# Patient Record
Sex: FEMALE | Race: WHITE | NOT HISPANIC OR LATINO
[De-identification: names, ages, dates, MRNs, and addresses within clinical notes are randomized per-mention and may not be internally consistent; named-entity substitution may affect disease eponyms.]

---

## 2018-02-20 ENCOUNTER — TRANSCRIPTION ENCOUNTER (OUTPATIENT)
Age: 43
End: 2018-02-20

## 2018-07-11 PROBLEM — Z00.00 ENCOUNTER FOR PREVENTIVE HEALTH EXAMINATION: Status: ACTIVE | Noted: 2018-07-11

## 2019-04-27 ENCOUNTER — TRANSCRIPTION ENCOUNTER (OUTPATIENT)
Age: 44
End: 2019-04-27

## 2019-04-30 ENCOUNTER — TRANSCRIPTION ENCOUNTER (OUTPATIENT)
Age: 44
End: 2019-04-30

## 2019-05-01 ENCOUNTER — APPOINTMENT (OUTPATIENT)
Dept: SURGERY | Facility: CLINIC | Age: 44
End: 2019-05-01

## 2021-09-04 ENCOUNTER — APPOINTMENT (OUTPATIENT)
Dept: MAMMOGRAPHY | Facility: CLINIC | Age: 46
End: 2021-09-04

## 2021-09-22 ENCOUNTER — OUTPATIENT (OUTPATIENT)
Dept: OUTPATIENT SERVICES | Facility: HOSPITAL | Age: 46
LOS: 1 days | End: 2021-09-22
Payer: COMMERCIAL

## 2021-09-22 ENCOUNTER — APPOINTMENT (OUTPATIENT)
Dept: ULTRASOUND IMAGING | Facility: CLINIC | Age: 46
End: 2021-09-22
Payer: COMMERCIAL

## 2021-09-22 ENCOUNTER — APPOINTMENT (OUTPATIENT)
Dept: MAMMOGRAPHY | Facility: CLINIC | Age: 46
End: 2021-09-22
Payer: COMMERCIAL

## 2021-09-22 DIAGNOSIS — Z00.8 ENCOUNTER FOR OTHER GENERAL EXAMINATION: ICD-10-CM

## 2021-09-22 PROCEDURE — 76641 ULTRASOUND BREAST COMPLETE: CPT | Mod: 26,50

## 2021-09-22 PROCEDURE — 77067 SCR MAMMO BI INCL CAD: CPT | Mod: 26

## 2021-09-22 PROCEDURE — 77063 BREAST TOMOSYNTHESIS BI: CPT | Mod: 26

## 2021-09-22 PROCEDURE — 76641 ULTRASOUND BREAST COMPLETE: CPT

## 2021-09-22 PROCEDURE — 77067 SCR MAMMO BI INCL CAD: CPT

## 2021-09-22 PROCEDURE — 77063 BREAST TOMOSYNTHESIS BI: CPT

## 2022-08-05 ENCOUNTER — APPOINTMENT (OUTPATIENT)
Dept: PLASTIC SURGERY | Facility: CLINIC | Age: 47
End: 2022-08-05

## 2022-08-05 VITALS
HEIGHT: 61.5 IN | SYSTOLIC BLOOD PRESSURE: 104 MMHG | BODY MASS INDEX: 19.2 KG/M2 | OXYGEN SATURATION: 98 % | TEMPERATURE: 97.9 F | WEIGHT: 103 LBS | DIASTOLIC BLOOD PRESSURE: 72 MMHG | HEART RATE: 71 BPM

## 2022-08-05 PROCEDURE — 99203 OFFICE O/P NEW LOW 30 MIN: CPT

## 2022-08-12 NOTE — PHYSICAL EXAM
[NI] : Normal [de-identified] : NAD, AxOx3 [de-identified] : non-labored breathing [de-identified] : Central middle forehead scar 1.2cm x 0.6 cm, depressed, hypopigmented

## 2022-08-12 NOTE — HISTORY OF PRESENT ILLNESS
[FreeTextEntry1] : JOE BEARD is a 47 year F who presents to discuss revision of a facial scar. She was referred by her neighbor who is a former patient. She states that in January 2022 she underwent Moh's surgery of the central forehead for BCC, this was performed by Dr. Adams. She states that since that time she has disliked the appearance of the scar which has become widened, depressed, and hypopigmented. She was scheduled for revision with her original Moh's surgeon, however, has decided she would like this to be done by a plastic surgeon instead. \par \par She is otherwise healthy. Takes vitamins. H/o D&C.

## 2022-10-21 ENCOUNTER — APPOINTMENT (OUTPATIENT)
Dept: PLASTIC SURGERY | Facility: CLINIC | Age: 47
End: 2022-10-21

## 2022-12-06 ENCOUNTER — NON-APPOINTMENT (OUTPATIENT)
Age: 47
End: 2022-12-06

## 2023-03-31 ENCOUNTER — APPOINTMENT (OUTPATIENT)
Dept: PLASTIC SURGERY | Facility: CLINIC | Age: 48
End: 2023-03-31

## 2023-07-06 ENCOUNTER — OUTPATIENT (OUTPATIENT)
Dept: OUTPATIENT SERVICES | Facility: HOSPITAL | Age: 48
LOS: 1 days | End: 2023-07-06
Payer: COMMERCIAL

## 2023-07-06 ENCOUNTER — APPOINTMENT (OUTPATIENT)
Dept: ULTRASOUND IMAGING | Facility: CLINIC | Age: 48
End: 2023-07-06
Payer: COMMERCIAL

## 2023-07-06 ENCOUNTER — APPOINTMENT (OUTPATIENT)
Dept: MAMMOGRAPHY | Facility: CLINIC | Age: 48
End: 2023-07-06
Payer: COMMERCIAL

## 2023-07-06 DIAGNOSIS — N60.19 DIFFUSE CYSTIC MASTOPATHY OF UNSPECIFIED BREAST: ICD-10-CM

## 2023-07-06 DIAGNOSIS — Z12.31 ENCOUNTER FOR SCREENING MAMMOGRAM FOR MALIGNANT NEOPLASM OF BREAST: ICD-10-CM

## 2023-07-06 PROCEDURE — 77067 SCR MAMMO BI INCL CAD: CPT | Mod: 26

## 2023-07-06 PROCEDURE — 77067 SCR MAMMO BI INCL CAD: CPT

## 2023-07-06 PROCEDURE — 76641 ULTRASOUND BREAST COMPLETE: CPT

## 2023-07-06 PROCEDURE — 77063 BREAST TOMOSYNTHESIS BI: CPT | Mod: 26

## 2023-07-06 PROCEDURE — 76641 ULTRASOUND BREAST COMPLETE: CPT | Mod: 26,50

## 2023-07-06 PROCEDURE — 77063 BREAST TOMOSYNTHESIS BI: CPT

## 2023-09-22 ENCOUNTER — APPOINTMENT (OUTPATIENT)
Dept: PLASTIC SURGERY | Facility: CLINIC | Age: 48
End: 2023-09-22
Payer: COMMERCIAL

## 2023-09-22 PROCEDURE — 99214 OFFICE O/P EST MOD 30 MIN: CPT

## 2024-01-30 ENCOUNTER — APPOINTMENT (OUTPATIENT)
Dept: PLASTIC SURGERY | Facility: CLINIC | Age: 49
End: 2024-01-30
Payer: COMMERCIAL

## 2024-01-30 VITALS
SYSTOLIC BLOOD PRESSURE: 123 MMHG | RESPIRATION RATE: 16 BRPM | HEART RATE: 86 BPM | BODY MASS INDEX: 19.94 KG/M2 | DIASTOLIC BLOOD PRESSURE: 81 MMHG | WEIGHT: 107 LBS | OXYGEN SATURATION: 100 % | HEIGHT: 61.5 IN

## 2024-01-30 PROCEDURE — 11401 EXC TR-EXT B9+MARG 0.6-1 CM: CPT | Mod: 59

## 2024-01-30 PROCEDURE — 14040 TIS TRNFR F/C/C/M/N/A/G/H/F: CPT

## 2024-02-06 ENCOUNTER — APPOINTMENT (OUTPATIENT)
Dept: PLASTIC SURGERY | Facility: CLINIC | Age: 49
End: 2024-02-06
Payer: COMMERCIAL

## 2024-02-06 PROCEDURE — 99024 POSTOP FOLLOW-UP VISIT: CPT

## 2024-02-07 NOTE — HISTORY OF PRESENT ILLNESS
[FreeTextEntry1] : JOE BEARD is a 49 year old female who presents today for first postoperative visit s/p central facial scar revision and excision of left chest lesion on 1/30/24. Patient has no complaints.  Pathology is pending

## 2024-02-07 NOTE — ADDENDUM
[FreeTextEntry1] :  I, Krish Green, documented this note as a scribe on behalf of Dr. Lauren Shikowitz-Behr, MD on 02/06/2024.

## 2024-02-07 NOTE — PHYSICAL EXAM
[de-identified] : NAD, AxOx3  [de-identified] : incision is healing well  clean, dry, and intact  no evidence of infection  [de-identified] : incision clean, dry, and intact  healing well  no evidence of infection  nylon sutures removed  [de-identified] : no edema  [de-identified] : as above [de-identified] : normal affect

## 2024-02-09 ENCOUNTER — NON-APPOINTMENT (OUTPATIENT)
Age: 49
End: 2024-02-09

## 2024-02-09 LAB — CORE LAB BIOPSY: NORMAL

## 2024-02-11 NOTE — END OF VISIT
[FreeTextEntry3] : All medical record entries made by the Scribe were at my, Dr. Lauren Shikowitz-Behr, MD, direction and personally dictated by me on 01/30/2024. I have reviewed the chart and agree that the record accurately reflects my personal performance of the history, physical exam, assessment and plan. I have also personally directed, reviewed, and agreed with the chart.
No indicators present

## 2024-02-11 NOTE — PROCEDURE
[___ ml Inj] : Anesthesia: [unfilled] ~Uml [1%] : 1% [With Epi] : with epinephrine [Betadine] : using betadine [Tolerated Well] : Post Procedure: the patient tolerated the procedure well [None] : None [No Strenuous Activity___Day(s)] : avoid strenuous activity for [unfilled] day(s) [___ Day(s)] : in [unfilled] day(s) [FreeTextEntry2] : Central facial scar revision and excision of left chest lesion [FreeTextEntry1] : h/o SCC of [place]head, left chest lesion [FreeTextEntry3] : 1% lidocaine with epinephrine [FreeTextEntry4] : none [FreeTextEntry6] : JOE BEARD is here for a revision of central facial scar with reconstruction and excision of left chest lesion. Risks, benefits, and alternatives to the procedure were discussed. Informed consent was obtained. The sites were first marked and then injected with 1% lidocaine with epinephrine to achieve anesthesia and vasoconstriction.    First at the site of the forehead scar, a #15 blade was used to excise the lesion, this was then passed of the field.  A sharp dissection scissor was then used to widely undermine the surrounding soft tissue. The skin flaps were then advance medially towards the central portion of the defect and reapproximated at the midline with a figure of 8 stitch. Superior and inferior standing cutaneous dog ear deformities were then marked out and excised. The remainder of the incision was then closed in layers.   Attention was then turned to the chest lesion. The lesion was then excised with a #15 blade and passed off the field for pathology. The site was then cleaned with normal saline and closed in layers.   The sites were then cleaned with normal saline and closed in layers. Skin was dressed with steri strips. Post-procedure instructions were discussed with the patient who expressed understanding. [FreeTextEntry5] : none [FreeTextEntry7] : facial scar and left chest lesion

## 2024-02-11 NOTE — ADDENDUM
[FreeTextEntry1] :  I, Krish Green, documented this note as a scribe on behalf of Dr. Lauren Shikowitz-Behr, MD on 01/30/2024.

## 2024-02-27 ENCOUNTER — APPOINTMENT (OUTPATIENT)
Dept: PLASTIC SURGERY | Facility: CLINIC | Age: 49
End: 2024-02-27
Payer: COMMERCIAL

## 2024-02-27 PROCEDURE — 99024 POSTOP FOLLOW-UP VISIT: CPT

## 2024-02-28 NOTE — END OF VISIT
[FreeTextEntry3] : All medical record entries made by the Scribe were at my, Dr. Lauren Shikowitz-Behr, MD, direction and personally dictated by me on 02/27/2024. I have reviewed the chart and agree that the record accurately reflects my personal performance of the history, physical exam, assessment and plan. I have also personally directed, reviewed, and agreed with the chart.

## 2024-02-28 NOTE — HISTORY OF PRESENT ILLNESS
[FreeTextEntry1] : JOE BEARD is a 49 year old female who presents today s/p central facial scar revision and excision of left chest lesion on 1/30/24. Patient has no complaints today. She states "puffy" area to scar has somewhat improved.  Forehead and chest pathology confirms scar

## 2024-02-28 NOTE — ADDENDUM
[FreeTextEntry1] :  I, Krish Green, documented this note as a scribe on behalf of Dr. Lauren Shikowitz-Behr, MD on 02/27/2024.

## 2024-03-12 ENCOUNTER — APPOINTMENT (OUTPATIENT)
Dept: PLASTIC SURGERY | Facility: CLINIC | Age: 49
End: 2024-03-12
Payer: COMMERCIAL

## 2024-03-12 DIAGNOSIS — L98.9 DISORDER OF THE SKIN AND SUBCUTANEOUS TISSUE, UNSPECIFIED: ICD-10-CM

## 2024-03-12 PROCEDURE — 99024 POSTOP FOLLOW-UP VISIT: CPT

## 2024-03-15 PROBLEM — L98.9 SKIN LESION OF CHEST WALL: Status: ACTIVE | Noted: 2023-09-22

## 2024-03-15 NOTE — PHYSICAL EXAM
[de-identified] : NAD, AxOx3  [de-identified] : forehead scar healing well less hyperemic, less fullness superiorly  no evidence of infection [de-identified] : left chest scar healing well  [de-identified] : no edema [de-identified] : as above [de-identified] : normal affect

## 2024-03-15 NOTE — END OF VISIT
[FreeTextEntry3] : All medical record entries made by the Scribe were at my, Dr. Lauren Shikowitz-Behr, MD, direction and personally dictated by me on 03/12/2024. I have reviewed the chart and agree that the record accurately reflects my personal performance of the history, physical exam, assessment and plan. I have also personally directed, reviewed, and agreed with the chart.

## 2024-03-15 NOTE — ADDENDUM
[FreeTextEntry1] :  I, Krish Green, documented this note as a scribe on behalf of Dr. Lauren Shikowitz-Behr, MD on 03/12/2024.

## 2024-03-15 NOTE — HISTORY OF PRESENT ILLNESS
[FreeTextEntry1] : JOE BEARD is a 49 year old female who presents today s/p central facial scar revision and excision of left chest lesion on 1/30/24. Patient has no complaints today. Patient states she feels the scar is less red and there has been improvement since last visit.

## 2024-04-26 ENCOUNTER — APPOINTMENT (OUTPATIENT)
Dept: PLASTIC SURGERY | Facility: CLINIC | Age: 49
End: 2024-04-26
Payer: COMMERCIAL

## 2024-04-26 DIAGNOSIS — Z85.828 OTHER SPECIFIED POSTPROCEDURAL STATES: ICD-10-CM

## 2024-04-26 DIAGNOSIS — Z98.890 OTHER SPECIFIED POSTPROCEDURAL STATES: ICD-10-CM

## 2024-04-26 DIAGNOSIS — L90.5 SCAR CONDITIONS AND FIBROSIS OF SKIN: ICD-10-CM

## 2024-04-26 PROCEDURE — 99213 OFFICE O/P EST LOW 20 MIN: CPT

## 2024-05-01 PROBLEM — L90.5 SCAR OF FOREHEAD: Status: ACTIVE | Noted: 2022-08-12

## 2024-05-01 PROBLEM — Z98.890 HISTORY OF BASAL CELL CARCINOMA (BCC) EXCISION: Status: ACTIVE | Noted: 2022-08-12

## 2024-05-01 NOTE — ADDENDUM
[FreeTextEntry1] :  I, Krish Green, documented this note as a scribe on behalf of Dr. Lauren Shikowitz-Behr, MD on 04/26/2024.

## 2024-05-01 NOTE — END OF VISIT
[FreeTextEntry3] : All medical record entries made by the Scribe were at my, Dr. Lauren Shikowitz-Behr, MD, direction and personally dictated by me on 04/26/2024. I have reviewed the chart and agree that the record accurately reflects my personal performance of the history, physical exam, assessment and plan. I have also personally directed, reviewed, and agreed with the chart.

## 2024-05-01 NOTE — HISTORY OF PRESENT ILLNESS
[FreeTextEntry1] : JOE BEARD is a 49 year old female who presents today s/p central facial scar revision and excision of left chest lesion on 1/30/24. Patient complains of puffiness above the forehead scar. She is asking if anything can be revised to improve contour. The chest surgical site is healing well, she is using scar cream and avoiding sun exposure.

## 2024-09-17 ENCOUNTER — APPOINTMENT (OUTPATIENT)
Dept: PLASTIC SURGERY | Facility: CLINIC | Age: 49
End: 2024-09-17

## 2024-09-17 DIAGNOSIS — L90.5 SCAR CONDITIONS AND FIBROSIS OF SKIN: ICD-10-CM

## 2024-09-17 DIAGNOSIS — Z98.890 OTHER SPECIFIED POSTPROCEDURAL STATES: ICD-10-CM

## 2024-09-17 DIAGNOSIS — L98.9 DISORDER OF THE SKIN AND SUBCUTANEOUS TISSUE, UNSPECIFIED: ICD-10-CM

## 2024-09-17 DIAGNOSIS — Z85.828 OTHER SPECIFIED POSTPROCEDURAL STATES: ICD-10-CM

## 2024-09-17 PROCEDURE — 99212 OFFICE O/P EST SF 10 MIN: CPT

## 2024-09-26 NOTE — END OF VISIT
[FreeTextEntry3] : All medical record entries made by the Scribe were at my, Dr. Lauren Shikowitz-Behr, MD, direction and personally dictated by me on 09/17/2024. I have reviewed the chart and agree that the record accurately reflects my personal performance of the history, physical exam, assessment and plan. I have also personally directed, reviewed, and agreed with the chart. [Time Spent: ___ minutes] : I have spent [unfilled] minutes of time on the encounter which excludes teaching and separately reported services.

## 2024-09-26 NOTE — HISTORY OF PRESENT ILLNESS
[FreeTextEntry1] : JOE BEARD is a 49 year old female who presents today s/p central facial scar revision and excision of left chest lesion on 1/30/24. Patient still with complaints of fullness to forehead scar but states has significantly improved. She continues with scar care.

## 2024-09-26 NOTE — PHYSICAL EXAM
[de-identified] : NAD, AxOx3  [de-identified] : nonlabored breathing  [de-identified] : no edema  [de-identified] : Forehead incision well healed  There is a mild contour deformity at the superior aspect

## 2024-09-26 NOTE — PHYSICAL EXAM
[de-identified] : NAD, AxOx3  [de-identified] : nonlabored breathing  [de-identified] : no edema  [de-identified] : Forehead incision well healed  There is a mild contour deformity at the superior aspect

## 2024-09-26 NOTE — ADDENDUM
[FreeTextEntry1] :  I, Krish Green, documented this note as a scribe on behalf of Dr. Lauren Shikowitz-Behr, MD on 09/17/2024.

## 2024-09-26 NOTE — PHYSICAL EXAM
[de-identified] : NAD, AxOx3  [de-identified] : nonlabored breathing  [de-identified] : no edema  [de-identified] : Forehead incision well healed  There is a mild contour deformity at the superior aspect

## 2025-01-07 ENCOUNTER — APPOINTMENT (OUTPATIENT)
Dept: PLASTIC SURGERY | Facility: CLINIC | Age: 50
End: 2025-01-07

## 2025-04-03 NOTE — PHYSICAL EXAM
FINDINGS FROM YOUR EXAM:  Colon polyp removed    INSTRUCTIONS:  During your exam biopsies were obtained. You will be called with the results within 7 to 10 days. If you have not heard from Dr. Dennis Garcia by then, please call his [place] office at [phone].    DIET:  Resume your regular diet today.    ACTIVITY:  Following sedation, your judgment, perception and coordination are impaired for a minimum of 24 hours. Therefore:  A RESPONSIBLE ADULT MUST ACCOMPANY YOU AND DRIVE YOU HOME.  Do not drive.   Do not return to work today.   Do not operate appliances or machinery.  Do not sign legal documents or be involved in work decisions.   Do not smoke or drink alcoholic beverages for 24 hours.  Plan to spend a few hours resting before resuming your normal routine.   If you are on narcotics or sedative medications, hold your dose today to avoid over-sedation.    SYMPTOMS TO WATCH FOR AFTER THE PROCEDURE:  Please call Dr. Dennis Garcia at [phone] or proceed to the nearest hospital in the event that you experience any of the following:     After an upper endoscopy:  Difficulty swallowing or breathing  Neck swelling  Pain  Nausea or vomiting  Abdominal distention  Fever  Severe throat pain. Mild throat soreness may follow this procedure. Warm salt-water gargle or lozenges may relieve your mild discomfort.   Red or black stools     After a colonoscopy or sigmoidoscopy:  Severe abdominal distention or pain. Some mild distention and/or cramping are normal after these procedures but should pass within 1-2 hours with the passage of air.   Rectal bleeding   Nausea or vomiting  Fever      CONTACT INFORMATION:  Dr. Dennis Garcia's office - [phone]  If you need to schedule a radiology imaging test call - [phone]    Understanding Colon and Rectal Polyps     The colon (also called the large intestine) is a muscular tube that forms the last part of the digestive tract. It absorbs water and stores food waste. The colon is  about 4 to 6 feet long. The rectum is the last 6 inches of the colon. The colon and rectum have a smooth lining composed of millions of cells. Changes in these cells can lead to growths in the colon that can become cancerous and should be removed.       The colon has a smooth lining composed of millions of cells.        Changes that occur in the cells that line the colon or rectum can lead to growths called polyps. Over a period of years, polyps can turn cancerous. Removing polyps early may prevent cancer from ever forming.      Polyps      Polyps are fleshy clumps of tissue that form on the lining of the colon or rectum. Small polyps are usually benign (not cancerous). However, over time, cells in a polyp can change and become cancerous. The larger a polyp grows, the more likely this is to happen. Also, certain types of polyps known as adenomatous polyps are considered premalignant. This means that they could become cancerous if they’re not removed.    Cancer   Almost all colorectal cancers start when polyp cells begin growing abnormally. As a cancerous tumor grows, it may involve more and more of the colon or rectum. In time, cancer can also grow beyond the colon or rectum and spread to nearby organs or to glands called lymph nodes. The cells can also travel to other parts of the body. This is known as metastasis. The earlier a cancerous tumor is removed, the better the chance of preventing its spread.            © 5265-5387 [employer], [address] [place] [address], [place] [address]. All rights reserved. This information is not intended as a substitute for professional medical care. Always follow your healthcare professional's instructions.    [de-identified] : NAD, AxOx3  [de-identified] : forehead scar healing well  there is a dog ear deformity at the proximal portion at incision  [de-identified] : nonlabored breathing [de-identified] : chest scar well healed   [de-identified] : no edema  [de-identified] : as above [de-identified] : normal affect